# Patient Record
Sex: MALE | Race: WHITE | Employment: OTHER | URBAN - METROPOLITAN AREA
[De-identification: names, ages, dates, MRNs, and addresses within clinical notes are randomized per-mention and may not be internally consistent; named-entity substitution may affect disease eponyms.]

---

## 2020-08-13 RX ORDER — ALLOPURINOL 100 MG/1
100 TABLET ORAL DAILY
COMMUNITY

## 2020-08-13 RX ORDER — ASPIRIN 81 MG/1
81 TABLET ORAL DAILY
COMMUNITY

## 2020-08-13 RX ORDER — ATORVASTATIN CALCIUM 20 MG/1
20 TABLET, FILM COATED ORAL DAILY
COMMUNITY

## 2020-08-13 RX ORDER — AMLODIPINE BESYLATE 5 MG/1
10 TABLET ORAL DAILY
COMMUNITY

## 2020-08-13 RX ORDER — LOSARTAN POTASSIUM 100 MG/1
100 TABLET ORAL DAILY
COMMUNITY

## 2020-08-13 NOTE — PROGRESS NOTES
[address] [facility] time___1020_________        Surgery time__1150__________    Take the following medications with a sip of water:     Do not eat or drink anything after 12:00 midnight prior to your surgery. This includes water chewing gum, mints and ice chips. You may brush your teeth and gargle the morning of your surgery, but do not swallow the water     Please see your family doctor/pediatrician for a history and physical and/or concerning medications. H&P 8/13/20 NUVIA Lawson    Bring any test results/reports from your physicians office. If you are under the care of a heart doctor or specialist doctor, please be aware that you may be asked to them for clearance    You may be asked to stop blood thinners such as Coumadin, Plavix, Fragmin, Lovenox, etc., or any anti-inflammatories such as:  Aspirin, Ibuprofen, Advil, Naproxen prior to your surgery. We also ask that you stop any OTC medications such as fish oil, vitamin E, glucosamine, garlic, Multivitamins, COQ 10, etc.    We ask that you do not smoke 24 hours prior to surgery  We ask that you do not  drink any alcoholic beverages 24 hours prior to surgery     You must make arrangements for a responsible adult to take you home after your surgery. For your safety you will not be allowed to leave alone or drive yourself home. Your surgery will be cancelled if you do not have a ride home. Also for your safety, it is strongly suggested that someone stay with you the first 24 hours after your surgery. A parent or legal guardian must accompany a child scheduled for surgery and plan to stay at the hospital until the child is discharged. Please do not bring other children with you. For your comfort, please wear simple loose fitting clothing to the hospital.  Please do not bring valuables. Wear short sleeve button down shirt or loose fitting shirt. Bring eye drops or ointment day of surgery.     Do not wear any make-up or nail polish on your fingers or toes      For your safety, please do not wear any jewelry or body piercing's on the day of surgery. All jewelry must be removed. If you have dentures, they will be removed before going to operating room. For your convenience, we will provide you with a container. If you wear contact lenses or glasses, they will be removed, please bring a case for them. If you have a living will and a durable power of  for healthcare, please bring in a copy. As part of our patient safety program to minimize surgical site infections, we ask you to do the following:    · Please notify your surgeon if you develop any illness between         now and the  day of your surgery. · This includes a cough, cold, fever, sore throat, nausea,         or vomiting, and diarrhea, etc.  ·  Please notify your surgeon if you experience dizziness, shortness         of breath or blurred vision between now and the time of your surgery. Do not shave your operative site 96 hours prior to surgery. For face and neck surgery, men may use an electric razor 48 hours   prior to surgery. You may shower the night before surgery or the morning of   your surgery with an antibacterial soap. You will need to bring a photo ID and insurance card    [facility] has an onsite pharmacy, would you like to utilize our pharmacy     If you will be staying overnight and use a C-pap machine, please bring   your C-pap to hospital     Our goal is to provide you with excellent care, therefore, visitors will be limited to two(2) in the room at a time so that we may focus on providing this care for you. Please contact pre-admission testing if you have any further questions. [facility] phone number:  [phone]   Please note these are generalized instructions for all surgical cases, you may be provided with more specific instructions according to your surgery.

## 2020-08-18 NOTE — PROGRESS NOTES
[address] [facility]  [phone]        Pre-Op Phone Call:     Patient Name: Vero Martinez     No relevant phone numbers on file. Home phone:  [phone]    Surgery Time:   11:50 AM     Arrival Time:  1020     Left extended Message:  NA     Message left with:     Recent change in health status:  No     Advised of transportation/ policy:  Yes     NPO policy reviewed:  Yes pt     Advised to take morning heart/blood pressure medications with sips of water morning of surgery? Yes     Instructed to bring eye drops, photo identification, and insurance card day of surgery? Yes     Advised to wear short sleeved button down shirt (no T-shirt underneath):  Yes     Advised not to wear jewelry, hairpins, or pantyhose day of surgery? Yes     Advised not to wear make-up and to wash face day of surgery?   Yes    Remarks:        Electronically signed by:  Zofia Jaramillo RN at 8/18/2020 3:21 PM

## 2020-08-19 ENCOUNTER — ANESTHESIA EVENT (OUTPATIENT)
Dept: SURGERY | Age: 85
End: 2020-08-19
Payer: MEDICARE

## 2020-08-19 ENCOUNTER — PREP FOR PROCEDURE (OUTPATIENT)
Dept: OPHTHALMOLOGY | Age: 85
End: 2020-08-19

## 2020-08-19 RX ORDER — SODIUM CHLORIDE 0.9 % (FLUSH) 0.9 %
10 SYRINGE (ML) INJECTION EVERY 12 HOURS SCHEDULED
Status: CANCELLED | OUTPATIENT
Start: 2020-08-19

## 2020-08-19 RX ORDER — CIPROFLOXACIN HYDROCHLORIDE 3.5 MG/ML
1 SOLUTION/ DROPS TOPICAL SEE ADMIN INSTRUCTIONS
Status: CANCELLED | OUTPATIENT
Start: 2020-08-19

## 2020-08-19 RX ORDER — SODIUM CHLORIDE 0.9 % (FLUSH) 0.9 %
10 SYRINGE (ML) INJECTION PRN
Status: CANCELLED | OUTPATIENT
Start: 2020-08-19

## 2020-08-19 RX ORDER — TETRACAINE HYDROCHLORIDE 5 MG/ML
1 SOLUTION OPHTHALMIC ONCE
Status: CANCELLED | OUTPATIENT
Start: 2020-08-19 | End: 2020-08-19

## 2020-08-20 ENCOUNTER — ANESTHESIA (OUTPATIENT)
Dept: SURGERY | Age: 85
End: 2020-08-20
Payer: MEDICARE

## 2020-08-20 ENCOUNTER — HOSPITAL ENCOUNTER (OUTPATIENT)
Age: 85
Setting detail: OUTPATIENT SURGERY
Discharge: HOME | End: 2020-08-20
Attending: OPHTHALMOLOGY | Admitting: OPHTHALMOLOGY
Payer: MEDICARE

## 2020-08-20 VITALS
RESPIRATION RATE: 26 BRPM | SYSTOLIC BLOOD PRESSURE: 195 MMHG | WEIGHT: 172 LBS | DIASTOLIC BLOOD PRESSURE: 66 MMHG | HEART RATE: 72 BPM | BODY MASS INDEX: 27 KG/M2 | TEMPERATURE: 96.9 F | HEIGHT: 67 IN | OXYGEN SATURATION: 98 %

## 2020-08-20 PROCEDURE — 6370000000 HC RX 637 (ALT 250 FOR IP): Performed by: OPHTHALMOLOGY

## 2020-08-20 PROCEDURE — 2580000003 HC RX 258: Performed by: ANESTHESIOLOGY

## 2020-08-20 RX ORDER — SODIUM CHLORIDE 9 MG/ML
INJECTION, SOLUTION INTRAVENOUS CONTINUOUS
Status: DISCONTINUED | OUTPATIENT
Start: 2020-08-20 | End: 2020-08-20 | Stop reason: HOSPADM

## 2020-08-20 RX ORDER — CIPROFLOXACIN HYDROCHLORIDE 3.5 MG/ML
1 SOLUTION/ DROPS TOPICAL SEE ADMIN INSTRUCTIONS
Status: COMPLETED | OUTPATIENT
Start: 2020-08-20 | End: 2020-08-20

## 2020-08-20 RX ORDER — SODIUM CHLORIDE 0.9 % (FLUSH) 0.9 %
10 SYRINGE (ML) INJECTION EVERY 12 HOURS SCHEDULED
Status: DISCONTINUED | OUTPATIENT
Start: 2020-08-20 | End: 2020-08-20 | Stop reason: HOSPADM

## 2020-08-20 RX ORDER — SODIUM CHLORIDE 0.9 % (FLUSH) 0.9 %
10 SYRINGE (ML) INJECTION PRN
Status: DISCONTINUED | OUTPATIENT
Start: 2020-08-20 | End: 2020-08-20 | Stop reason: HOSPADM

## 2020-08-20 RX ORDER — TETRACAINE HYDROCHLORIDE 5 MG/ML
1 SOLUTION OPHTHALMIC ONCE
Status: COMPLETED | OUTPATIENT
Start: 2020-08-20 | End: 2020-08-20

## 2020-08-20 RX ADMIN — SODIUM CHLORIDE: 9 INJECTION, SOLUTION INTRAVENOUS at 10:27

## 2020-08-20 RX ADMIN — CIPROFLOXACIN 1 DROP: 3 SOLUTION OPHTHALMIC at 10:17

## 2020-08-20 RX ADMIN — Medication 3 ML: at 10:30

## 2020-08-20 RX ADMIN — TETRACAINE HYDROCHLORIDE 1 DROP: 5 SOLUTION OPHTHALMIC at 10:17

## 2020-08-20 RX ADMIN — CIPROFLOXACIN 1 DROP: 3 SOLUTION OPHTHALMIC at 10:29

## 2020-08-20 RX ADMIN — POVIDONE-IODINE: 5 SOLUTION OPHTHALMIC at 10:29

## 2020-08-20 RX ADMIN — Medication 3 ML: at 10:17

## 2020-08-20 ASSESSMENT — PAIN - FUNCTIONAL ASSESSMENT: PAIN_FUNCTIONAL_ASSESSMENT: 0-10

## 2020-08-20 ASSESSMENT — ENCOUNTER SYMPTOMS: SHORTNESS OF BREATH: 0

## 2020-08-20 NOTE — H&P
The H&P was reviewed, the patient was examined, and no change has occurred in the patient's condition since the H&P was completed. Monitoring BP.

## 2020-08-20 NOTE — ANESTHESIA PRE PROCEDURE
[facility] Department of Anesthesiology  Pre-Anesthesia Evaluation/Consultation       Name:  Yulia Byrnes  : 1935  Age:  82 y.o. MRN:  [id number]  Date: 2020           Surgeon: Surgeon(s):  Roderick Martinez MD    Procedure: Procedure(s):  RIGHT EYE Phacoemulsification with intraocular lens implant     No Known Allergies  There is no problem list on file for this patient. Past Medical History:   Diagnosis Date    Atrial fibrillation ([address]     CAD (coronary artery disease)     CHF (congestive heart failure) (HCC)     Gout     Hyperlipidemia     Hypertension      Past Surgical History:   Procedure Laterality Date    CARDIAC CATHETERIZATION      CORONARY ARTERY BYPASS GRAFT      triple 6 yrs ago    TONSILLECTOMY       Social History     Tobacco Use    Smoking status: Never Smoker    Smokeless tobacco: Never Used   Substance Use Topics    Alcohol use: Yes     Comment: rare    Drug use: Never     Medications  No current facility-administered medications on file prior to encounter.       Current Outpatient Medications on File Prior to Encounter   Medication Sig Dispense Refill    losartan (COZAAR) 100 MG tablet Take 100 mg by mouth daily Indications: takes at dinner       allopurinol (ZYLOPRIM) 100 MG tablet Take 100 mg by mouth daily      atorvastatin (LIPITOR) 20 MG tablet Take 20 mg by mouth daily      aspirin 81 MG EC tablet Take 81 mg by mouth daily      amLODIPine (NORVASC) 5 MG tablet Take 5 mg by mouth daily Indications: takes in the evening       Current Facility-Administered Medications   Medication Dose Route Frequency Provider Last Rate Last Dose    0.9 % sodium chloride infusion   Intravenous Continuous Ivonne Contreras MD 75 mL/hr at 20 1027      sodium chloride flush 0.9 % injection 10 mL  10 mL Intravenous 2 times per day Ivonne Contreras MD        sodium chloride flush 0.9 % injection 10 mL  10 mL Intravenous GLUCOSE  POCGlucose  No results for input(s): GLUCOSE in the last 72 hours. Coags  No results found for: PROTIME, INR, APTT  HCG (If Applicable) No results found for: PREGTESTUR, PREGSERUM, HCG, HCGQUANT   ABGs No results found for: PHART, PO2ART, IYJ9MHP, OPY9AKO, BEART, [id number]   Type & Screen (If Applicable)  No results found for: LABABO, LABRH                         BMI: Wt Readings from Last 3 Encounters:       NPO Status:   Date of last liquid consumption: 08/19/20   Time of last liquid consumption: 2200   Date of last solid food consumption: 08/19/20      Time of last solid consumption: 2200       Anesthesia Evaluation  Patient summary reviewed  Airway: Mallampati: III  TM distance: >3 FB   Neck ROM: full  Mouth opening: > = 3 FB Dental:          Pulmonary:       (-) COPD, asthma and shortness of breath                           Cardiovascular:    (+) hypertension:, CAD: no interval change, CABG/stent: no interval change, dysrhythmias: atrial fibrillation, CHF:,     (-) valvular problems/murmurs, past MI and  angina                Neuro/Psych:      (-) seizures, TIA and CVA           GI/Hepatic/Renal:        (-) GERD, PUD, liver disease and no renal disease       Endo/Other:        (-) diabetes mellitus               Abdominal:           Vascular: negative vascular ROS. Anesthesia Plan      MAC     ASA 3     (I discussed local anesthesia with intravenous sedation to the   patient's satisfaction and agreed including risks and alternatives. The  patient has no further questions. AURORA LAMBERT )  Induction: intravenous. Anesthetic plan and risks discussed with patient. Plan discussed with CRNA. This pre-anesthesia assessment may be used as a history and physical.    DOS STAFF ADDENDUM:    Pt seen and examined, chart reviewed (including anesthesia, drug and allergy history). No interval changes to history and physical examination. Anesthetic plan, risks, benefits, alternatives, and personnel involved discussed with patient. Patient verbalized an understanding and agrees to proceed.       Blanca Barnes MD  August 20, 2020  11:44 AM

## 2020-08-27 NOTE — PROGRESS NOTES
[address] [facility] time__0620__________        Surgery time____0750________    Take the following medications with a sip of water: takes blood pressure medications in the evening    Do not eat or drink anything after 12:00 midnight prior to your surgery. This includes water chewing gum, mints and ice chips. You may brush your teeth and gargle the morning of your surgery, but do not swallow the water     Please see your family doctor/pediatrician for a history and physical and/or concerning medications. H&P Dr. Concepción Gan 8/13   Bring any test results/reports from your physicians office. If you are under the care of a heart doctor or specialist doctor, please be aware that you may be asked to them for clearance    You may be asked to stop blood thinners such as Coumadin, Plavix, Fragmin, Lovenox, etc., or any anti-inflammatories such as:  Aspirin, Ibuprofen, Advil, Naproxen prior to your surgery. We also ask that you stop any OTC medications such as fish oil, vitamin E, glucosamine, garlic, Multivitamins, COQ 10, etc.    We ask that you do not smoke 24 hours prior to surgery  We ask that you do not  drink any alcoholic beverages 24 hours prior to surgery     You must make arrangements for a responsible adult to take you home after your surgery. For your safety you will not be allowed to leave alone or drive yourself home. Your surgery will be cancelled if you do not have a ride home. Also for your safety, it is strongly suggested that someone stay with you the first 24 hours after your surgery. A parent or legal guardian must accompany a child scheduled for surgery and plan to stay at the hospital until the child is discharged. Please do not bring other children with you. For your comfort, please wear simple loose fitting clothing to the hospital.  Please do not bring valuables. Wear short sleeve button down shirt or loose fitting shirt.  Bting eye drops surgery.

## 2020-09-03 ENCOUNTER — ANESTHESIA EVENT (OUTPATIENT)
Dept: SURGERY | Age: 85
End: 2020-09-03
Payer: MEDICARE

## 2020-09-03 NOTE — PRE-PROCEDURE INSTRUCTIONS
[address]        Pre-Op Phone Call:     Patient Name: Kenya Roberts     Telephone Information:   Mobile [phone]     Home phone:  [phone]    Surgery Time:    7:50 AM     Arrival Time:  4650     Left extended Message:  No     Message left with:     Recent change in health status:  No     Advised of transportation/ policy:  Yes     NPO policy reviewed:  yes     Advised to take morning heart/blood pressure medications with sips of water morning of surgery? Yes     Instructed to bring eye drops, photo identification, and insurance card day of surgery? Yes     Advised to wear short sleeved button down shirt (no T-shirt underneath):  Yes     Advised not to wear jewelry, hairpins, or pantyhose day of surgery? Yes     Advised not to wear make-up and to wash face day of surgery? Yes    Remarks: Has not been around anyone with CoVid 19 nor had any symptoms of CoVid.         Electronically signed by:  Roxane Benavides RN at 9/3/2020 2:20 PM

## 2020-09-04 ENCOUNTER — HOSPITAL ENCOUNTER (OUTPATIENT)
Age: 85
Setting detail: OUTPATIENT SURGERY
Discharge: HOME | End: 2020-09-04
Attending: OPHTHALMOLOGY | Admitting: OPHTHALMOLOGY
Payer: MEDICARE

## 2020-09-04 ENCOUNTER — ANESTHESIA (OUTPATIENT)
Dept: SURGERY | Age: 85
End: 2020-09-04
Payer: MEDICARE

## 2020-09-04 VITALS
BODY MASS INDEX: 27.47 KG/M2 | TEMPERATURE: 96.8 F | OXYGEN SATURATION: 96 % | DIASTOLIC BLOOD PRESSURE: 65 MMHG | SYSTOLIC BLOOD PRESSURE: 151 MMHG | HEIGHT: 67 IN | WEIGHT: 175 LBS | HEART RATE: 77 BPM | RESPIRATION RATE: 16 BRPM

## 2020-09-04 VITALS — DIASTOLIC BLOOD PRESSURE: 64 MMHG | OXYGEN SATURATION: 99 % | SYSTOLIC BLOOD PRESSURE: 143 MMHG

## 2020-09-04 PROCEDURE — 3600000004 HC SURGERY LEVEL 4 BASE: Performed by: OPHTHALMOLOGY

## 2020-09-04 PROCEDURE — 2709999900 HC NON-CHARGEABLE SUPPLY: Performed by: OPHTHALMOLOGY

## 2020-09-04 PROCEDURE — 6370000000 HC RX 637 (ALT 250 FOR IP): Performed by: OPHTHALMOLOGY

## 2020-09-04 PROCEDURE — 3700000001 HC ADD 15 MINUTES (ANESTHESIA): Performed by: OPHTHALMOLOGY

## 2020-09-04 PROCEDURE — 6360000002 HC RX W HCPCS: Performed by: NURSE ANESTHETIST, CERTIFIED REGISTERED

## 2020-09-04 PROCEDURE — 7100000010 HC PHASE II RECOVERY - FIRST 15 MIN: Performed by: OPHTHALMOLOGY

## 2020-09-04 PROCEDURE — 3600000014 HC SURGERY LEVEL 4 ADDTL 15MIN: Performed by: OPHTHALMOLOGY

## 2020-09-04 PROCEDURE — V2632 POST CHMBR INTRAOCULAR LENS: HCPCS | Performed by: OPHTHALMOLOGY

## 2020-09-04 PROCEDURE — 2580000003 HC RX 258: Performed by: ANESTHESIOLOGY

## 2020-09-04 PROCEDURE — 3700000000 HC ANESTHESIA ATTENDED CARE: Performed by: OPHTHALMOLOGY

## 2020-09-04 PROCEDURE — 2500000003 HC RX 250 WO HCPCS: Performed by: OPHTHALMOLOGY

## 2020-09-04 DEVICE — LENS INTOCU +20.0 DIOPT L13MM DIA6MM 0DEG HAPTIC ANG A: Type: IMPLANTABLE DEVICE | Site: EYE | Status: FUNCTIONAL

## 2020-09-04 RX ORDER — CIPROFLOXACIN HYDROCHLORIDE 3.5 MG/ML
1 SOLUTION/ DROPS TOPICAL SEE ADMIN INSTRUCTIONS
Status: DISCONTINUED | OUTPATIENT
Start: 2020-09-04 | End: 2020-09-04 | Stop reason: HOSPADM

## 2020-09-04 RX ORDER — TROPICAMIDE 10 MG/ML
1 SOLUTION/ DROPS OPHTHALMIC SEE ADMIN INSTRUCTIONS
Status: DISCONTINUED | OUTPATIENT
Start: 2020-09-04 | End: 2020-09-04 | Stop reason: HOSPADM

## 2020-09-04 RX ORDER — OFLOXACIN 3 MG/ML
SOLUTION/ DROPS OPHTHALMIC
Status: COMPLETED | OUTPATIENT
Start: 2020-09-04 | End: 2020-09-04

## 2020-09-04 RX ORDER — BALANCED SALT SOLUTION ENRICHED WITH BICARBONATE, DEXTROSE, AND GLUTATHIONE
KIT INTRAOCULAR
Status: COMPLETED | OUTPATIENT
Start: 2020-09-04 | End: 2020-09-04

## 2020-09-04 RX ORDER — SODIUM CHLORIDE 0.9 % (FLUSH) 0.9 %
10 SYRINGE (ML) INJECTION PRN
Status: DISCONTINUED | OUTPATIENT
Start: 2020-09-04 | End: 2020-09-04 | Stop reason: HOSPADM

## 2020-09-04 RX ORDER — BRIMONIDINE TARTRATE 2 MG/ML
SOLUTION/ DROPS OPHTHALMIC
Status: COMPLETED | OUTPATIENT
Start: 2020-09-04 | End: 2020-09-04

## 2020-09-04 RX ORDER — SODIUM CHLORIDE 9 MG/ML
INJECTION, SOLUTION INTRAVENOUS CONTINUOUS
Status: DISCONTINUED | OUTPATIENT
Start: 2020-09-04 | End: 2020-09-04 | Stop reason: HOSPADM

## 2020-09-04 RX ORDER — TETRACAINE HYDROCHLORIDE 5 MG/ML
SOLUTION OPHTHALMIC
Status: COMPLETED | OUTPATIENT
Start: 2020-09-04 | End: 2020-09-04

## 2020-09-04 RX ORDER — TETRACAINE HYDROCHLORIDE 5 MG/ML
1 SOLUTION OPHTHALMIC ONCE
Status: COMPLETED | OUTPATIENT
Start: 2020-09-04 | End: 2020-09-04

## 2020-09-04 RX ORDER — MIDAZOLAM HYDROCHLORIDE 1 MG/ML
INJECTION INTRAMUSCULAR; INTRAVENOUS PRN
Status: DISCONTINUED | OUTPATIENT
Start: 2020-09-04 | End: 2020-09-04 | Stop reason: SDUPTHER

## 2020-09-04 RX ORDER — CYCLOPENTOLATE HYDROCHLORIDE 10 MG/ML
1 SOLUTION/ DROPS OPHTHALMIC SEE ADMIN INSTRUCTIONS
Status: DISCONTINUED | OUTPATIENT
Start: 2020-09-04 | End: 2020-09-04 | Stop reason: HOSPADM

## 2020-09-04 RX ORDER — BALANCED SALT SOLUTION 6.4; .75; .48; .3; 3.9; 1.7 MG/ML; MG/ML; MG/ML; MG/ML; MG/ML; MG/ML
SOLUTION OPHTHALMIC
Status: COMPLETED | OUTPATIENT
Start: 2020-09-04 | End: 2020-09-04

## 2020-09-04 RX ORDER — FENTANYL CITRATE 50 UG/ML
INJECTION, SOLUTION INTRAMUSCULAR; INTRAVENOUS PRN
Status: DISCONTINUED | OUTPATIENT
Start: 2020-09-04 | End: 2020-09-04 | Stop reason: SDUPTHER

## 2020-09-04 RX ORDER — SODIUM CHLORIDE 0.9 % (FLUSH) 0.9 %
10 SYRINGE (ML) INJECTION EVERY 12 HOURS SCHEDULED
Status: DISCONTINUED | OUTPATIENT
Start: 2020-09-04 | End: 2020-09-04 | Stop reason: HOSPADM

## 2020-09-04 RX ORDER — PHENYLEPHRINE HCL 2.5 %
1 DROPS OPHTHALMIC (EYE) SEE ADMIN INSTRUCTIONS
Status: DISCONTINUED | OUTPATIENT
Start: 2020-09-04 | End: 2020-09-04 | Stop reason: HOSPADM

## 2020-09-04 RX ORDER — LIDOCAINE HYDROCHLORIDE 10 MG/ML
INJECTION, SOLUTION EPIDURAL; INFILTRATION; INTRACAUDAL; PERINEURAL
Status: COMPLETED | OUTPATIENT
Start: 2020-09-04 | End: 2020-09-04

## 2020-09-04 RX ADMIN — PHENYLEPHRINE HYDROCHLORIDE 1 DROP: 25 SOLUTION/ DROPS OPHTHALMIC at 06:57

## 2020-09-04 RX ADMIN — TETRACAINE HYDROCHLORIDE 1 DROP: 5 SOLUTION OPHTHALMIC at 06:52

## 2020-09-04 RX ADMIN — TROPICAMIDE 1 DROP: 10 SOLUTION/ DROPS OPHTHALMIC at 06:52

## 2020-09-04 RX ADMIN — TROPICAMIDE 1 DROP: 10 SOLUTION/ DROPS OPHTHALMIC at 06:57

## 2020-09-04 RX ADMIN — FENTANYL CITRATE 25 MCG: 50 INJECTION INTRAMUSCULAR; INTRAVENOUS at 07:54

## 2020-09-04 RX ADMIN — SODIUM CHLORIDE: 9 INJECTION, SOLUTION INTRAVENOUS at 07:01

## 2020-09-04 RX ADMIN — CIPROFLOXACIN 1 DROP: 3 SOLUTION OPHTHALMIC at 06:52

## 2020-09-04 RX ADMIN — MIDAZOLAM 1 MG: 1 INJECTION INTRAMUSCULAR; INTRAVENOUS at 07:54

## 2020-09-04 RX ADMIN — CYCLOPENTOLATE HYDROCHLORIDE 1 DROP: 10 SOLUTION/ DROPS OPHTHALMIC at 06:57

## 2020-09-04 RX ADMIN — CYCLOPENTOLATE HYDROCHLORIDE 1 DROP: 10 SOLUTION/ DROPS OPHTHALMIC at 06:52

## 2020-09-04 RX ADMIN — PHENYLEPHRINE HYDROCHLORIDE 1 DROP: 25 SOLUTION/ DROPS OPHTHALMIC at 06:52

## 2020-09-04 RX ADMIN — FENTANYL CITRATE 25 MCG: 50 INJECTION INTRAMUSCULAR; INTRAVENOUS at 07:56

## 2020-09-04 ASSESSMENT — PAIN SCALES - GENERAL
PAINLEVEL_OUTOF10: 0
PAINLEVEL_OUTOF10: 0

## 2020-09-04 ASSESSMENT — PAIN - FUNCTIONAL ASSESSMENT: PAIN_FUNCTIONAL_ASSESSMENT: 0-10

## 2020-09-04 ASSESSMENT — ENCOUNTER SYMPTOMS: SHORTNESS OF BREATH: 0

## 2020-09-04 NOTE — ANESTHESIA PRE PROCEDURE
[facility] Department of Anesthesiology  Pre-Anesthesia Evaluation/Consultation       Name:  Isai Moura  : 1935  Age:  82 y.o. MRN:  [id number]  Date: 2020           Surgeon: Surgeon(s):  Opal Mahmood MD    Procedure: Procedure(s):  RIGHT EYE Phacoemulsification with intraocular lens implant     No Known Allergies  There is no problem list on file for this patient. Past Medical History:   Diagnosis Date    Atrial fibrillation ([address]     CAD (coronary artery disease)     CHF (congestive heart failure) (HCC)     Gout     Hyperlipidemia     Hypertension      Past Surgical History:   Procedure Laterality Date    CARDIAC CATHETERIZATION      CORONARY ARTERY BYPASS GRAFT      triple 6 yrs ago    TONSILLECTOMY       Social History     Tobacco Use    Smoking status: Never Smoker    Smokeless tobacco: Never Used   Substance Use Topics    Alcohol use: Yes     Comment: rare    Drug use: Never     Medications  No current facility-administered medications on file prior to encounter.       Current Outpatient Medications on File Prior to Encounter   Medication Sig Dispense Refill    losartan (COZAAR) 100 MG tablet Take 100 mg by mouth daily Indications: takes at dinner       allopurinol (ZYLOPRIM) 100 MG tablet Take 100 mg by mouth daily      atorvastatin (LIPITOR) 20 MG tablet Take 20 mg by mouth daily      aspirin 81 MG EC tablet Take 81 mg by mouth daily      amLODIPine (NORVASC) 5 MG tablet Take 10 mg by mouth daily Indications: takes in the evening        Current Facility-Administered Medications   Medication Dose Route Frequency Provider Last Rate Last Dose    0.9 % sodium chloride infusion   Intravenous Continuous Mason Thomas  mL/hr at 20 0701      sodium chloride flush 0.9 % injection 10 mL  10 mL Intravenous 2 times per day Mason Thomas MD        sodium chloride flush 0.9 % injection 10 mL  10 mL Intravenous PRN Radha Burch Lorrie Lang MD        cyclopentolate (CYCLOGYL) 1 % ophthalmic solution 1 drop  1 drop Right Eye See Admin Instructions Kiran Goodman MD   1 drop at 20 0657    tropicamide (MYDRIACYL) 1 % ophthalmic solution 1 drop  1 drop Right Eye See Admin Instructions Kiran Goodman MD   1 drop at 20 0657    phenylephrine (MYDFRIN) 2.5 % ophthalmic solution 1 drop  1 drop Right Eye See Admin Instructions Kiran Goodman MD   1 drop at 20 0657    ciprofloxacin (CILOXAN) 0.3 % ophthalmic solution 1 drop  1 drop Right Eye See Admin Instructions Kiran Goodman MD   1 drop at 20 0652     Vital Signs (Current)   Vitals:    20 1524 20   BP:  (!) 174/65   Pulse:  87   Resp:  16   Temp:  97.8 °F (36.6 °C)   TempSrc:  Temporal   SpO2:  98%   Weight: 175 lb (79.4 kg) 175 lb (79.4 kg)   Height: 5' 7\" (1.702 m) 5' 7\" (1.702 m)                                          BP Readings from Last 3 Encounters:   20 (!) 174/65   20 (!) 195/66     Vital Signs Statistics (for past 48 hrs)     Temp  Av.8 °F (36.6 °C)  Min: 97.8 °F (36.6 °C)   Min taken time: 20  Max: 97.8 °F (36.6 °C)   Max taken time: 20  Pulse  Av  Min: 80   Min taken time: 20  Max: 80   Max taken time: 20  Resp  Av  Min: 12   Min taken time: 20  Max: 12   Max taken time: 20  BP  Min: 174/65   Min taken time: 20  Max: 174/65   Max taken time: 20  SpO2  Av %  Min: 98 %   Min taken time: 20  Max: 98 %   Max taken time: 20  BP Readings from Last 3 Encounters:   20 (!) 174/65   20 (!) 195/66       BMI  Body mass index is 27.41 kg/m². Estimated body mass index is 27.41 kg/m² as calculated from the following:    Height as of this encounter: 5' 7\" (1.702 m). Weight as of this encounter: 175 lb (79.4 kg).     CBC No results found for: WBC, RBC, HGB, HCT, MCV, RDW, PLT  CMP  No results found for: NA, K, CL, CO2, BUN, CREATININE, GFRAA, AGRATIO, LABGLOM, GLUCOSE, PROT, CALCIUM, BILITOT, ALKPHOS, AST, ALT  BMP  No results found for: NA, K, CL, CO2, BUN, CREATININE, CALCIUM, GFRAA, LABGLOM, GLUCOSE  POCGlucose  No results for input(s): GLUCOSE in the last 72 hours. Coags  No results found for: PROTIME, INR, APTT  HCG (If Applicable) No results found for: PREGTESTUR, PREGSERUM, HCG, HCGQUANT   ABGs No results found for: PHART, PO2ART, OII4EJG, XGI6DWU, BEART, N7QGJBWL   Type & Screen (If Applicable)  No results found for: LABABO, LABRH                         BMI: Wt Readings from Last 3 Encounters:       NPO Status:   Date of last liquid consumption: 09/03/20   Time of last liquid consumption: 2200   Date of last solid food consumption: 09/03/20      Time of last solid consumption: 2200       Anesthesia Evaluation  Patient summary reviewed  Airway: Mallampati: III  TM distance: >3 FB   Neck ROM: full  Mouth opening: > = 3 FB Dental:          Pulmonary:       (-) COPD, asthma and shortness of breath                           Cardiovascular:    (+) hypertension:, CAD: no interval change, CABG/stent: no interval change, dysrhythmias: atrial fibrillation, CHF:,     (-) valvular problems/murmurs, past MI and  angina                Neuro/Psych:      (-) seizures, TIA and CVA           GI/Hepatic/Renal:        (-) GERD, PUD, liver disease and no renal disease       Endo/Other:        (-) diabetes mellitus               Abdominal:           Vascular: negative vascular ROS. Anesthesia Plan      MAC     ASA 3     (I discussed local anesthesia with intravenous sedation to the   patient's satisfaction and agreed including risks and alternatives. The  patient has no further questions. AURORA LAMBERT )  Induction: intravenous. Anesthetic plan and risks discussed with patient. Plan discussed with CRNA.                   This pre-anesthesia assessment may be used as a history and physical.    DOS STAFF ADDENDUM:    Pt seen and examined, chart reviewed (including anesthesia, drug and allergy history). No interval changes to history and physical examination. Anesthetic plan, risks, benefits, alternatives, and personnel involved discussed with patient. Patient verbalized an understanding and agrees to proceed.       Yareli Fischer MD  September 4, 2020  7:22 AM

## 2020-09-04 NOTE — ANESTHESIA POSTPROCEDURE EVALUATION
Department of Anesthesiology  Postprocedure Note    Patient: Jyothi Arroyo  MRN: [id number]  YOB: 1935  Date of evaluation: 9/4/2020  Time:  9:19 AM     Procedure Summary     Date:  09/04/20 Room / Location:  [facility]    Anesthesia Start:  0580 Anesthesia Stop:  1103    Procedure:  RIGHT EYE Phacoemulsification with intraocular lens implant (Right Eye) Diagnosis:       Combined forms of age-related cataract of right eye      (Combined forms of age-related cataract of right eye)    Surgeon:  Robb Villar MD Responsible Provider:  Radha Russell MD    Anesthesia Type:  MAC ASA Status:  3          Anesthesia Type: MAC    Sue Phase I: Sue Score: 10    Sue Phase II: Sue Score: 10    Last vitals: Reviewed and per EMR flowsheets.        Anesthesia Post Evaluation    Patient location during evaluation: PACU  Level of consciousness: awake and alert  Airway patency: patent  Nausea & Vomiting: no nausea and no vomiting  Complications: no  Cardiovascular status: blood pressure returned to baseline  Respiratory status: acceptable  Hydration status: euvolemic  Comments: Postoperative Anesthesia Note    Name:    Jyothi Arroyo  MRN:      [id number]    Patient Vitals in the past 12 hrs:  09/04/20 0820, BP:(!) 151/65, Pulse:77, Resp:16, SpO2:96 %  09/04/20 0817, BP:(!) 182/66, Temp:96.8 °F (36 °C), Temp src:Temporal, Pulse:83, Resp:17, SpO2:97 %  09/04/20 0652, BP:(!) 174/65, Temp:97.8 °F (36.6 °C), Temp src:Temporal, Pulse:87, Resp:16, SpO2:98 %, Height:5' 7\" (1.702 m), Weight:175 lb (79.4 kg)     LABS:    CBC  No results found for: WBC, HGB, HCT, PLT  RENAL  No results found for: NA, K, CL, CO2, BUN, CREATININE, GLUCOSE  COAGS  No results found for: PROTIME, INR, APTT    Intake & Output:  @10OWLO@    Nausea & Vomiting:  No    Level of Consciousness:  Awake    Pain Assessment:  Adequate analgesia    Anesthesia Complications:  No apparent anesthetic complications    SUMMARY      Vital signs stable  OK to discharge from Stage I post anesthesia care.   Care transferred from Anesthesiology department on discharge from perioperative area

## 2020-09-04 NOTE — OP NOTE
Jevon Grace    OPERATIVE NOTE    Preoperative Diagnosis: Cataract right eye    Postoperative Diagnosis: Cataract right eye    Procedure: Phacoemulsification with intraocular lens implantation, right eye  Surgeon: Betty Olsen MD    Anesthesia: MAC, topical.    Complications: none    Estimated blood loss: less than 1 ml. Specimens: none    Indications for procedure: The patient is a 82y.o. year old with decreased vision, glare and halos around lights, and trouble with activities of daily living. Examination revealed a visually significant cataract in the right eye. Risks, benefits, and alternatives to surgery were discussed with the patient and the patient elected to proceed with phacoemulsification with lens implantation. Details of the procedure: Following informed consent, the patient was taken to the operating room and placed in the supine position. Monitored anesthesia care was administered. The eye was prepped and draped in the usual sterile fashion using aseptic technique for cataract surgery. A side port incision was made. 1% preservative free lidocaine was injected through the side port incision for topical anesthesia. The eye was filled with viscoelastic and a 2.4 mm keratome blade was used to make a 3-plane clear corneal incision in the temporal cornea. The cystitome was used to make a tear in the anterior capsule and a Utrata forceps was used to make a complete curvilinear capsulorrhexis. The lens was hydrodissected and freely rotated. Phacoemulsification was performed. Irrigation/aspiration was used to remove all cortical material from the capsular bag. The eye was filled with viscoelastic and a foldable posterior chamber intraocular lens was injected into the capsular bag. The lens was rotated to the appropriate axis as needed. Irrigation/aspiration was used to remove all excess viscoelastic.   The eye was pressurized with BSS and the wounds were check for leaks and none were found.  The patient had ofloxacin and Alphagan solutions placed on the eye. The patient went to the PACU in excellent condition, having tolerated the procedure well.

## 2020-09-08 NOTE — PROGRESS NOTES
[facility] [facility] time___0715_________        Surgery time____0845________    Take the following medications with a sip of water:    Do not eat or drink anything after 12:00 midnight prior to your surgery. This includes water chewing gum, mints and ice chips. You may brush your teeth and gargle the morning of your surgery, but do not swallow the water     Please see your family doctor/pediatrician for a history and physical and/or concerning medications. H&P 8/13/2020 Dr. Concepcion Messer Bring any test results/reports from your physicians office. If you are under the care of a heart doctor or specialist doctor, please be aware that you may be asked to them for clearance    You may be asked to stop blood thinners such as Coumadin, Plavix, Fragmin, Lovenox, etc., or any anti-inflammatories such as:  Aspirin, Ibuprofen, Advil, Naproxen prior to your surgery. We also ask that you stop any OTC medications such as fish oil, vitamin E, glucosamine, garlic, Multivitamins, COQ 10, etc.    We ask that you do not smoke 24 hours prior to surgery  We ask that you do not  drink any alcoholic beverages 24 hours prior to surgery     You must make arrangements for a responsible adult to take you home after your surgery. For your safety you will not be allowed to leave alone or drive yourself home. Your surgery will be cancelled if you do not have a ride home. Also for your safety, it is strongly suggested that someone stay with you the first 24 hours after your surgery. A parent or legal guardian must accompany a child scheduled for surgery and plan to stay at the hospital until the child is discharged. Please do not bring other children with you. For your comfort, please wear simple loose fitting clothing to the hospital.  Please do not bring valuables. Wear short sleeve button down shirt or loose fitting shirt. Bting eye drops or ointment.   Do not wear any make-up or nail polish on your fingers or toes      For your safety, please do not wear any jewelry or body piercing's on the day of surgery. All jewelry must be removed. If you have dentures, they will be removed before going to operating room. For your convenience, we will provide you with a container. If you wear contact lenses or glasses, they will be removed, please bring a case for them. If you have a living will and a durable power of  for healthcare, please bring in a copy. As part of our patient safety program to minimize surgical site infections, we ask you to do the following:    · Please notify your surgeon if you develop any illness between         now and the  day of your surgery. · This includes a cough, cold, fever, sore throat, nausea,         or vomiting, and diarrhea, etc.  ·  Please notify your surgeon if you experience dizziness, shortness         of breath or blurred vision between now and the time of your surgery. Do not shave your operative site 96 hours prior to surgery. For face and neck surgery, men may use an electric razor 48 hours   prior to surgery. You may shower the night before surgery or the morning of   your surgery with an antibacterial soap. You will need to bring a photo ID and insurance card    [facility] has an onsite pharmacy, would you like to utilize our pharmacy     If you will be staying overnight and use a C-pap machine, please bring   your C-pap to hospital     Our goal is to provide you with excellent care, therefore, visitors will be limited to two(2) in the room at a time so that we may focus on providing this care for you. Please contact pre-admission testing if you have any further questions. [facility] phone number:  [phone]  Please note these are generalized instructions for all surgical cases, you may be provided with more specific instructions according to your surgery.

## 2020-09-10 ENCOUNTER — ANESTHESIA EVENT (OUTPATIENT)
Dept: SURGERY | Age: 85
End: 2020-09-10
Payer: MEDICARE

## 2020-09-10 ENCOUNTER — PREP FOR PROCEDURE (OUTPATIENT)
Dept: OPHTHALMOLOGY | Age: 85
End: 2020-09-10

## 2020-09-10 RX ORDER — SODIUM CHLORIDE 0.9 % (FLUSH) 0.9 %
10 SYRINGE (ML) INJECTION PRN
Status: CANCELLED | OUTPATIENT
Start: 2020-09-10

## 2020-09-10 RX ORDER — SODIUM CHLORIDE 0.9 % (FLUSH) 0.9 %
10 SYRINGE (ML) INJECTION EVERY 12 HOURS SCHEDULED
Status: CANCELLED | OUTPATIENT
Start: 2020-09-10

## 2020-09-10 RX ORDER — CIPROFLOXACIN HYDROCHLORIDE 3.5 MG/ML
1 SOLUTION/ DROPS TOPICAL SEE ADMIN INSTRUCTIONS
Status: CANCELLED | OUTPATIENT
Start: 2020-09-10

## 2020-09-10 RX ORDER — TETRACAINE HYDROCHLORIDE 5 MG/ML
1 SOLUTION OPHTHALMIC ONCE
Status: CANCELLED | OUTPATIENT
Start: 2020-09-10 | End: 2020-09-10

## 2020-09-10 NOTE — PRE-PROCEDURE INSTRUCTIONS
Pre-Op Phone Call:    Patient Name: Amor Casiano    Phone number called: ____home______      Phone number called: ___[phone]_______    Extended Message: no  Message left with:     Recent change in health status: no      Advised of transportation/ policy: yes    NPO policy reviewed: yes  Advised to take morning heart/blood pressure medications with sips of water morning of surgery? yes    Instructed to bring eye drops, photo identification, and insurance card day of surgery? yes    Advised to wear short sleeved button down shirt (no T-shirt underneath): yes    Advised not to wear jewelry, hairpins, or pantyhose day of surgery? Yes    Advised not to wear make-up and to wash face day of surgery?      Remarks:    Silke Martines RN, 9/10/2020, 11:39 AM

## 2020-09-11 ENCOUNTER — HOSPITAL ENCOUNTER (OUTPATIENT)
Age: 85
Setting detail: OUTPATIENT SURGERY
Discharge: HOME | End: 2020-09-11
Attending: OPHTHALMOLOGY | Admitting: OPHTHALMOLOGY
Payer: MEDICARE

## 2020-09-11 ENCOUNTER — ANESTHESIA (OUTPATIENT)
Dept: SURGERY | Age: 85
End: 2020-09-11
Payer: MEDICARE

## 2020-09-11 VITALS
DIASTOLIC BLOOD PRESSURE: 76 MMHG | SYSTOLIC BLOOD PRESSURE: 157 MMHG | OXYGEN SATURATION: 100 % | RESPIRATION RATE: 20 BRPM

## 2020-09-11 VITALS
WEIGHT: 175 LBS | BODY MASS INDEX: 27.47 KG/M2 | RESPIRATION RATE: 14 BRPM | SYSTOLIC BLOOD PRESSURE: 170 MMHG | DIASTOLIC BLOOD PRESSURE: 68 MMHG | TEMPERATURE: 97 F | HEART RATE: 66 BPM | OXYGEN SATURATION: 99 % | HEIGHT: 67 IN

## 2020-09-11 PROCEDURE — 2580000003 HC RX 258: Performed by: ANESTHESIOLOGY

## 2020-09-11 PROCEDURE — 2709999900 HC NON-CHARGEABLE SUPPLY: Performed by: OPHTHALMOLOGY

## 2020-09-11 PROCEDURE — 6370000000 HC RX 637 (ALT 250 FOR IP): Performed by: OPHTHALMOLOGY

## 2020-09-11 PROCEDURE — 3600000014 HC SURGERY LEVEL 4 ADDTL 15MIN: Performed by: OPHTHALMOLOGY

## 2020-09-11 PROCEDURE — 6360000002 HC RX W HCPCS: Performed by: NURSE ANESTHETIST, CERTIFIED REGISTERED

## 2020-09-11 PROCEDURE — 2500000003 HC RX 250 WO HCPCS: Performed by: OPHTHALMOLOGY

## 2020-09-11 PROCEDURE — 3700000001 HC ADD 15 MINUTES (ANESTHESIA): Performed by: OPHTHALMOLOGY

## 2020-09-11 PROCEDURE — V2632 POST CHMBR INTRAOCULAR LENS: HCPCS | Performed by: OPHTHALMOLOGY

## 2020-09-11 PROCEDURE — 3700000000 HC ANESTHESIA ATTENDED CARE: Performed by: OPHTHALMOLOGY

## 2020-09-11 PROCEDURE — 6360000002 HC RX W HCPCS: Performed by: ANESTHESIOLOGY

## 2020-09-11 PROCEDURE — 3600000004 HC SURGERY LEVEL 4 BASE: Performed by: OPHTHALMOLOGY

## 2020-09-11 PROCEDURE — 7100000010 HC PHASE II RECOVERY - FIRST 15 MIN: Performed by: OPHTHALMOLOGY

## 2020-09-11 DEVICE — LENS INTOCU +20.0 DIOPT L13MM DIA6MM 0DEG HAPTIC ANG A: Type: IMPLANTABLE DEVICE | Site: EYE | Status: FUNCTIONAL

## 2020-09-11 RX ORDER — TETRACAINE HYDROCHLORIDE 5 MG/ML
1 SOLUTION OPHTHALMIC ONCE
Status: COMPLETED | OUTPATIENT
Start: 2020-09-11 | End: 2020-09-11

## 2020-09-11 RX ORDER — BALANCED SALT SOLUTION 6.4; .75; .48; .3; 3.9; 1.7 MG/ML; MG/ML; MG/ML; MG/ML; MG/ML; MG/ML
SOLUTION OPHTHALMIC
Status: COMPLETED | OUTPATIENT
Start: 2020-09-11 | End: 2020-09-11

## 2020-09-11 RX ORDER — CYCLOPENTOLATE HYDROCHLORIDE 10 MG/ML
1 SOLUTION/ DROPS OPHTHALMIC SEE ADMIN INSTRUCTIONS
Status: DISCONTINUED | OUTPATIENT
Start: 2020-09-11 | End: 2020-09-11 | Stop reason: HOSPADM

## 2020-09-11 RX ORDER — TROPICAMIDE 10 MG/ML
1 SOLUTION/ DROPS OPHTHALMIC SEE ADMIN INSTRUCTIONS
Status: DISCONTINUED | OUTPATIENT
Start: 2020-09-11 | End: 2020-09-11 | Stop reason: HOSPADM

## 2020-09-11 RX ORDER — CIPROFLOXACIN HYDROCHLORIDE 3.5 MG/ML
1 SOLUTION/ DROPS TOPICAL SEE ADMIN INSTRUCTIONS
Status: COMPLETED | OUTPATIENT
Start: 2020-09-11 | End: 2020-09-11

## 2020-09-11 RX ORDER — PHENYLEPHRINE HCL 2.5 %
1 DROPS OPHTHALMIC (EYE) SEE ADMIN INSTRUCTIONS
Status: DISCONTINUED | OUTPATIENT
Start: 2020-09-11 | End: 2020-09-11 | Stop reason: HOSPADM

## 2020-09-11 RX ORDER — ONDANSETRON 2 MG/ML
4 INJECTION INTRAMUSCULAR; INTRAVENOUS
Status: DISCONTINUED | OUTPATIENT
Start: 2020-09-11 | End: 2020-09-11 | Stop reason: HOSPADM

## 2020-09-11 RX ORDER — TETRACAINE HYDROCHLORIDE 5 MG/ML
SOLUTION OPHTHALMIC
Status: COMPLETED | OUTPATIENT
Start: 2020-09-11 | End: 2020-09-11

## 2020-09-11 RX ORDER — SODIUM CHLORIDE 0.9 % (FLUSH) 0.9 %
10 SYRINGE (ML) INJECTION PRN
Status: DISCONTINUED | OUTPATIENT
Start: 2020-09-11 | End: 2020-09-11 | Stop reason: SDUPTHER

## 2020-09-11 RX ORDER — SODIUM CHLORIDE 0.9 % (FLUSH) 0.9 %
10 SYRINGE (ML) INJECTION PRN
Status: DISCONTINUED | OUTPATIENT
Start: 2020-09-11 | End: 2020-09-11 | Stop reason: HOSPADM

## 2020-09-11 RX ORDER — MIDAZOLAM HYDROCHLORIDE 1 MG/ML
INJECTION INTRAMUSCULAR; INTRAVENOUS PRN
Status: DISCONTINUED | OUTPATIENT
Start: 2020-09-11 | End: 2020-09-11 | Stop reason: SDUPTHER

## 2020-09-11 RX ORDER — BRIMONIDINE TARTRATE 2 MG/ML
SOLUTION/ DROPS OPHTHALMIC
Status: COMPLETED | OUTPATIENT
Start: 2020-09-11 | End: 2020-09-11

## 2020-09-11 RX ORDER — KETOROLAC TROMETHAMINE 5 MG/ML
1 SOLUTION OPHTHALMIC SEE ADMIN INSTRUCTIONS
Status: DISCONTINUED | OUTPATIENT
Start: 2020-09-11 | End: 2020-09-11 | Stop reason: HOSPADM

## 2020-09-11 RX ORDER — SODIUM CHLORIDE 9 MG/ML
INJECTION, SOLUTION INTRAVENOUS CONTINUOUS
Status: DISCONTINUED | OUTPATIENT
Start: 2020-09-11 | End: 2020-09-11 | Stop reason: HOSPADM

## 2020-09-11 RX ORDER — SODIUM CHLORIDE 0.9 % (FLUSH) 0.9 %
10 SYRINGE (ML) INJECTION EVERY 12 HOURS SCHEDULED
Status: DISCONTINUED | OUTPATIENT
Start: 2020-09-11 | End: 2020-09-11 | Stop reason: SDUPTHER

## 2020-09-11 RX ORDER — OFLOXACIN 3 MG/ML
SOLUTION/ DROPS OPHTHALMIC
Status: COMPLETED | OUTPATIENT
Start: 2020-09-11 | End: 2020-09-11

## 2020-09-11 RX ORDER — SODIUM CHLORIDE 0.9 % (FLUSH) 0.9 %
10 SYRINGE (ML) INJECTION EVERY 12 HOURS SCHEDULED
Status: DISCONTINUED | OUTPATIENT
Start: 2020-09-11 | End: 2020-09-11 | Stop reason: HOSPADM

## 2020-09-11 RX ORDER — FENTANYL CITRATE 50 UG/ML
INJECTION, SOLUTION INTRAMUSCULAR; INTRAVENOUS PRN
Status: DISCONTINUED | OUTPATIENT
Start: 2020-09-11 | End: 2020-09-11 | Stop reason: SDUPTHER

## 2020-09-11 RX ADMIN — KETOROLAC TROMETHAMINE 1 DROP: 5 SOLUTION/ DROPS OPHTHALMIC at 07:39

## 2020-09-11 RX ADMIN — PHENYLEPHRINE HYDROCHLORIDE 1 DROP: 25 SOLUTION/ DROPS OPHTHALMIC at 07:44

## 2020-09-11 RX ADMIN — MIDAZOLAM 1 MG: 1 INJECTION INTRAMUSCULAR; INTRAVENOUS at 08:50

## 2020-09-11 RX ADMIN — FENTANYL CITRATE 25 MCG: 50 INJECTION INTRAMUSCULAR; INTRAVENOUS at 08:50

## 2020-09-11 RX ADMIN — TROPICAMIDE 1 DROP: 10 SOLUTION/ DROPS OPHTHALMIC at 07:44

## 2020-09-11 RX ADMIN — POVIDONE-IODINE 0.1 ML: 5 SOLUTION OPHTHALMIC at 07:44

## 2020-09-11 RX ADMIN — PHENYLEPHRINE HYDROCHLORIDE 1 DROP: 25 SOLUTION/ DROPS OPHTHALMIC at 07:30

## 2020-09-11 RX ADMIN — SODIUM CHLORIDE: 9 INJECTION, SOLUTION INTRAVENOUS at 07:47

## 2020-09-11 RX ADMIN — CYCLOPENTOLATE HYDROCHLORIDE 1 DROP: 10 SOLUTION/ DROPS OPHTHALMIC at 07:30

## 2020-09-11 RX ADMIN — KETOROLAC TROMETHAMINE 1 DROP: 5 SOLUTION/ DROPS OPHTHALMIC at 07:44

## 2020-09-11 RX ADMIN — TROPICAMIDE 1 DROP: 10 SOLUTION/ DROPS OPHTHALMIC at 07:39

## 2020-09-11 RX ADMIN — CIPROFLOXACIN 1 DROP: 3 SOLUTION OPHTHALMIC at 07:30

## 2020-09-11 RX ADMIN — CYCLOPENTOLATE HYDROCHLORIDE 1 DROP: 10 SOLUTION/ DROPS OPHTHALMIC at 07:39

## 2020-09-11 RX ADMIN — TROPICAMIDE 1 DROP: 10 SOLUTION/ DROPS OPHTHALMIC at 07:30

## 2020-09-11 RX ADMIN — PHENYLEPHRINE HYDROCHLORIDE 1 DROP: 25 SOLUTION/ DROPS OPHTHALMIC at 07:39

## 2020-09-11 RX ADMIN — CYCLOPENTOLATE HYDROCHLORIDE 1 DROP: 10 SOLUTION/ DROPS OPHTHALMIC at 07:44

## 2020-09-11 RX ADMIN — CIPROFLOXACIN 1 DROP: 3 SOLUTION OPHTHALMIC at 07:39

## 2020-09-11 RX ADMIN — KETOROLAC TROMETHAMINE 1 DROP: 5 SOLUTION/ DROPS OPHTHALMIC at 07:30

## 2020-09-11 RX ADMIN — TETRACAINE HYDROCHLORIDE 1 DROP: 5 SOLUTION OPHTHALMIC at 07:30

## 2020-09-11 ASSESSMENT — ENCOUNTER SYMPTOMS: SHORTNESS OF BREATH: 0

## 2020-09-11 ASSESSMENT — PAIN SCALES - GENERAL
PAINLEVEL_OUTOF10: 0
PAINLEVEL_OUTOF10: 0

## 2020-09-11 ASSESSMENT — PAIN - FUNCTIONAL ASSESSMENT: PAIN_FUNCTIONAL_ASSESSMENT: 0-10

## 2020-09-11 NOTE — ANESTHESIA PRE PROCEDURE
Department of Anesthesiology  Preprocedure Note       Name:  Rafy Munson   Age:  82 y.o.  :  1935                                          MRN:  [id number]         Date:  2020      Surgeon: Kim Armstrong):  Jyoti Domínguez MD    Procedure: Procedure(s):  LEFT EYE Phacoemulsification with intraocular lens implant    Medications prior to admission:   Prior to Admission medications    Medication Sig Start Date End Date Taking?  Authorizing Provider   losartan (COZAAR) 100 MG tablet Take 100 mg by mouth daily Indications: takes at dinner    Yes Historical Provider, MD   allopurinol (ZYLOPRIM) 100 MG tablet Take 100 mg by mouth daily   Yes Historical Provider, MD   atorvastatin (LIPITOR) 20 MG tablet Take 20 mg by mouth daily   Yes Historical Provider, MD   aspirin 81 MG EC tablet Take 81 mg by mouth daily   Yes Historical Provider, MD   amLODIPine (NORVASC) 5 MG tablet Take 10 mg by mouth daily Indications: takes in the evening    Yes Historical Provider, MD       Current medications:    Current Facility-Administered Medications   Medication Dose Route Frequency Provider Last Rate Last Dose    0.9 % sodium chloride infusion   Intravenous Continuous Olena Mcdaniels MD        sodium chloride flush 0.9 % injection 10 mL  10 mL Intravenous 2 times per day Olena Mcdaniels MD        sodium chloride flush 0.9 % injection 10 mL  10 mL Intravenous PRN Olena Mcdaniels MD        povidone-iodine 5 % ophthalmic solution 0.1 mL  1 drop Left Eye Once Jyoti Domínguez MD        cyclopentolate (CYCLOGYL) 1 % ophthalmic solution 1 drop  1 drop Left Eye See Admin Instructions Jyoti Domínguez MD   1 drop at 20 0739    tropicamide (MYDRIACYL) 1 % ophthalmic solution 1 drop  1 drop Left Eye See Admin Instructions Jyoti Domínguez MD   1 drop at 20 0739    ketorolac (ACULAR) 0.5 % ophthalmic solution 1 drop  1 drop Left Eye See Admin Instructions Jyoti Domínguez MD   1 drop at 09/11/20 0739    phenylephrine (MYDFRIN) 2.5 % ophthalmic solution 1 drop  1 drop Left Eye See Admin Instructions Kiran Goodman MD   1 drop at 09/11/20 0015       Allergies:  No Known Allergies    Problem List:  There is no problem list on file for this patient. Past Medical History:        Diagnosis Date    Atrial fibrillation ([address]     CAD (coronary artery disease)     CHF (congestive heart failure) (HCC)     Gout     Hyperlipidemia     Hypertension        Past Surgical History:        Procedure Laterality Date    CARDIAC CATHETERIZATION      CORONARY ARTERY BYPASS GRAFT      triple 6 yrs ago    INTRACAPSULAR CATARACT EXTRACTION Right 9/4/2020    RIGHT EYE Phacoemulsification with intraocular lens implant performed by Kiran Goodman MD at [address] History:    Social History     Tobacco Use    Smoking status: Never Smoker    Smokeless tobacco: Never Used   Substance Use Topics    Alcohol use: Yes     Comment: rare                                Counseling given: Not Answered      Vital Signs (Current):   Vitals:    09/08/20 0807 09/11/20 0734   BP:  (!) 166/75   Pulse:  74   Resp:  15   Temp:  98.2 °F (36.8 °C)   TempSrc:  Temporal   SpO2:  100%   Weight: 175 lb (79.4 kg) 175 lb (79.4 kg)   Height: 5' 7\" (1.702 m) 5' 7\" (1.702 m)                                              BP Readings from Last 3 Encounters:   09/11/20 (!) 166/75   09/04/20 (!) 143/64   09/04/20 (!) 151/65       NPO Status: Time of last liquid consumption: 2200                        Time of last solid consumption: 2200                        Date of last liquid consumption: 09/10/20                        Date of last solid food consumption: 09/10/20    BMI:   Wt Readings from Last 3 Encounters:   09/11/20 175 lb (79.4 kg)   09/04/20 175 lb (79.4 kg)   08/20/20 172 lb (78 kg)     Body mass index is 27.41 kg/m².     CBC: No results found for: WBC, RBC, HGB, HCT, MCV, RDW, PLT    CMP: No results found for: NA, K, CL, CO2, BUN, CREATININE, GFRAA, AGRATIO, LABGLOM, GLUCOSE, PROT, CALCIUM, BILITOT, ALKPHOS, AST, ALT    POC Tests: No results for input(s): POCGLU, POCNA, POCK, POCCL, POCBUN, POCHEMO, POCHCT in the last 72 hours. Coags: No results found for: PROTIME, INR, APTT    HCG (If Applicable): No results found for: PREGTESTUR, PREGSERUM, HCG, HCGQUANT     ABGs: No results found for: PHART, PO2ART, MEL0HJE, VFH5AYU, BEART, V9YVMMLL     Type & Screen (If Applicable):  No results found for: LABABO, LABRH    Drug/Infectious Status (If Applicable):  No results found for: HIV, HEPCAB    COVID-19 Screening (If Applicable): No results found for: COVID19      Anesthesia Evaluation  Patient summary reviewed no history of anesthetic complications:   Airway: Mallampati: II  TM distance: >3 FB   Neck ROM: full  Mouth opening: > = 3 FB Dental:      Comment: Missing teeth    Pulmonary: breath sounds clear to auscultation      (-) COPD, asthma, shortness of breath, recent URI and sleep apnea                           Cardiovascular:    (+) hypertension:, CAD:, CABG/stent (CABG 3v 2015):, dysrhythmias (p. afib): atrial fibrillation, CHF:, hyperlipidemia    (-) murmur      Rhythm: regular  Rate: normal      Cleared by cardiology              Neuro/Psych:      (-) seizures, neuromuscular disease, TIA, CVA, headaches and psychiatric history           GI/Hepatic/Renal:        (-) GERD, PUD, hepatitis, liver disease, no renal disease and bowel prep       Endo/Other:        (-) diabetes mellitus, hypothyroidism, hyperthyroidism, blood dyscrasia               Abdominal:           Vascular:                                      Anesthesia Plan      MAC     ASA 3       Induction: intravenous. Anesthetic plan and risks discussed with patient. Plan discussed with CRNA.               This pre-anesthesia assessment may be used as a history and physical.    DOS STAFF ADDENDUM:    Pt seen and examined, chart reviewed (including anesthesia, drug and allergy history). No interval changes to history and physical examination. Anesthetic plan, risks, benefits, alternatives, and personnel involved discussed with patient. Patient verbalized an understanding and agrees to proceed.       Diana Barrow MD  September 11, 2020  7:41 AM        Diana Barrow MD   9/11/2020

## 2020-09-11 NOTE — OP NOTE
Daniele Edner    OPERATIVE NOTE    Preoperative Diagnosis: Cataract left eye    Postoperative Diagnosis: Cataract left eye    Procedure: Phacoemulsification with intraocular lens implantation, left eye  Surgeon: Mack Green MD    Anesthesia: MAC, topical.    Complications: none    Estimated blood loss: less than 1 ml. Specimens: none    Indications for procedure: The patient is a 82y.o. year old with decreased vision, glare and halos around lights, and trouble with activities of daily living. Examination revealed a visually significant cataract in the left eye. Risks, benefits, and alternatives to surgery were discussed with the patient and the patient elected to proceed with phacoemulsification with lens implantation. Details of the procedure: Following informed consent, the patient was taken to the operating room and placed in the supine position. Monitored anesthesia care was administered. The eye was prepped and draped in the usual sterile fashion using aseptic technique for cataract surgery. A side port incision was made. 1% preservative free lidocaine was injected through the side port incision for topical anesthesia. The eye was filled with viscoelastic and a 2.4 mm keratome blade was used to make a 3-plane clear corneal incision in the temporal cornea. The cystitome was used to make a tear in the anterior capsule and a Utrata forceps was used to make a complete curvilinear capsulorrhexis. The lens was hydrodissected and freely rotated. Phacoemulsification was performed. Irrigation/aspiration was used to remove all cortical material from the capsular bag. The eye was filled with viscoelastic and a foldable posterior chamber intraocular lens was injected into the capsular bag. The lens was rotated to the appropriate axis as needed. Irrigation/aspiration was used to remove all excess viscoelastic. The eye was pressurized with BSS and the wounds were check for leaks and none were found. The patient had ofloxacin and Alphagan solutions placed on the eye. The patient went to the PACU in excellent condition, having tolerated the procedure well.

## 2020-09-11 NOTE — ANESTHESIA POSTPROCEDURE EVALUATION
Department of Anesthesiology  Postprocedure Note    Patient: Kathia Cornejo  MRN: [id number]  YOB: 1935  Date of evaluation: 9/11/2020  Time:  9:27 AM     Procedure Summary     Date:  09/11/20 Room / Location:  [address]    Anesthesia Start:  0848 Anesthesia Stop:  5475    Procedure:  LEFT EYE Phacoemulsification with intraocular lens implant (Left Eye) Diagnosis:       Combined forms of age-related cataract of left eye      (Combined forms of age-related cataract of left eye)    Surgeon:  Tucker Pineda MD Responsible Provider:  Wendy Brooke MD    Anesthesia Type:  MAC ASA Status:  3          Anesthesia Type: MAC    Sue Phase I: Sue Score: 10    Sue Phase II: Sue Score: 10    Last vitals: Reviewed and per EMR flowsheets.        Anesthesia Post Evaluation    Patient location during evaluation: PACU  Patient participation: complete - patient participated  Level of consciousness: awake and alert  Airway patency: patent  Nausea & Vomiting: no nausea and no vomiting  Complications: no  Cardiovascular status: hemodynamically stable  Respiratory status: acceptable  Hydration status: stable

## 2024-06-08 ENCOUNTER — OFFICE VISIT (OUTPATIENT)
Age: 89
End: 2024-06-08

## 2024-06-08 VITALS
WEIGHT: 180.6 LBS | SYSTOLIC BLOOD PRESSURE: 161 MMHG | TEMPERATURE: 97.5 F | OXYGEN SATURATION: 96 % | BODY MASS INDEX: 28.29 KG/M2 | HEART RATE: 87 BPM | DIASTOLIC BLOOD PRESSURE: 70 MMHG

## 2024-06-08 DIAGNOSIS — M79.89 SWELLING OF LEFT HAND: Primary | ICD-10-CM

## 2024-06-08 DIAGNOSIS — I10 ELEVATED BLOOD PRESSURE READING IN OFFICE WITH DIAGNOSIS OF HYPERTENSION: ICD-10-CM

## 2024-06-08 ASSESSMENT — ENCOUNTER SYMPTOMS
ABDOMINAL PAIN: 0
SHORTNESS OF BREATH: 0

## 2024-06-08 NOTE — PATIENT INSTRUCTIONS
I will call with x-ray results.  Follow-up with PCP as soon as possible.  Keep hand elevated and ice the area.  Return for severe/worsening symptoms.

## 2024-06-08 NOTE — PROGRESS NOTES
Chi Brown (:  1935) is a 88 y.o. male,New patient, here for evaluation of the following chief complaint(s):  Hand Pain (Pt here for left hand swelling for past 2 days.)    Assessment & Plan :  Visit Diagnoses and Associated Orders       Swelling of left hand    -  Primary    XR HAND LEFT (MIN 3 VIEWS) [19275 CPT(R)]           Elevated blood pressure reading in office with diagnosis of hypertension                   Xray Result (most recent):  XR HAND LEFT (MIN 3 VIEWS) 2024    Narrative  EXAMINATION:  THREE XRAY VIEWS OF THE LEFT HAND    2024 6:49 pm    COMPARISON:  None.    HISTORY:  left hand swelling    FINDINGS:  Osseous structures of the hand appear intact and align normally.  Bone  mineralization appears abnormally low.  No retained radiopaque foreign body.  Joint spaces appear mildly to moderately degenerated, predominantly 1st  carpometacarpal joint.  Mild diffuse soft tissue edema about the hand and  wrist pain    Impression  1. No acute osseous abnormality.  2.  Soft tissue edema, presumably reactive edema, contusion and/or sprain.  3. Bones appear osteoporotic.  4. Mild-to-moderate severity osteoarthritis.      Differential diagnoses: cellulitis, musculoskeletal sprain/strain, fracture to hand, septic arthritis, osteoarthritis    Plan: X-ray of left hand performed in the office today. X-ray showed no acute osseous abnormality, did show osteoporosis and mild to moderate severity osteoarthritis. Did speak with the patient about the results. Discussed that he may have mild cellulitis to his left hand from the dog scratch, patient is declining antibiotics as he wants to be seen by his PCP and will wait to see him next week. Advised to follow-up with PCP as planned and to also discuss with PCP elevated blood pressure reading. He does report that he is taking his blood pressure medication as prescribed. Return precautions discussed. Follow up in 3 days if symptoms persist or if symptoms

## (undated) DEVICE — SYRINGE MED 3ML CLR PLAS STD N CTRL LUERLOCK TIP DISP

## (undated) DEVICE — SOLUTION IV IRRIG WATER 500ML POUR BRL ST 2F7113

## (undated) DEVICE — SYRINGE TB 1ML NDL 25GA L0.625IN PLAS SLIP TIP CONVENTIONAL

## (undated) DEVICE — GLOVE,SURG,TRIUMPH MICRO,LTX,PF,7.5: Brand: MEDLINE

## (undated) DEVICE — Device: Brand: MEDEX

## (undated) DEVICE — SURGICAL PROC PACK SHT WEST V4

## (undated) DEVICE — Device

## (undated) DEVICE — SOLUTION IRRIG BSS ST 500ML

## (undated) DEVICE — SYRINGE MED 30ML STD CLR PLAS LUERLOCK TIP N CTRL DISP